# Patient Record
Sex: MALE | Race: WHITE | Employment: UNEMPLOYED | URBAN - METROPOLITAN AREA
[De-identification: names, ages, dates, MRNs, and addresses within clinical notes are randomized per-mention and may not be internally consistent; named-entity substitution may affect disease eponyms.]

---
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
## 2025-03-27 ENCOUNTER — OFFICE VISIT (OUTPATIENT)
Dept: PHYSICAL THERAPY | Age: 20
End: 2025-03-27

## 2025-03-27 ENCOUNTER — OCC HEALTH (OUTPATIENT)
Dept: OCCUPATIONAL MEDICINE | Age: 20
End: 2025-03-27

## 2025-03-27 VITALS
WEIGHT: 180 LBS | SYSTOLIC BLOOD PRESSURE: 101 MMHG | BODY MASS INDEX: 22.38 KG/M2 | HEART RATE: 74 BPM | DIASTOLIC BLOOD PRESSURE: 62 MMHG | HEIGHT: 75 IN

## 2025-03-27 DIAGNOSIS — Z02.1 PRE-EMPLOYMENT HEALTH SCREENING EXAMINATION: Primary | ICD-10-CM

## 2025-03-27 DIAGNOSIS — Z02.89 VISIT FOR OCCUPATIONAL HEALTH EXAMINATION: Primary | ICD-10-CM

## 2025-03-27 PROCEDURE — OH063 AUDIOMETRIC TESTING INTERP

## 2025-03-27 PROCEDURE — OH021 PRE PLACEMENT PHYSICAL EXAM

## 2025-03-27 PROCEDURE — OH056 PRE PLACEMENT FUNCTIONAL TESTING

## 2025-03-27 PROCEDURE — 92552 PURE TONE AUDIOMETRY AIR: CPT

## (undated) NOTE — LETTER
Date & Time: 3/21/2018, 9:38 AM  Patient: Kecia Patterson  Attending Provider:    Sincerely,    Krupa Camp MD         To Whom It May Concern:    Johnathan Rojas was seen and treated in our department on 3/21/2018.  He can return to school on 3/